# Patient Record
Sex: MALE | Race: WHITE | NOT HISPANIC OR LATINO | Employment: FULL TIME | URBAN - METROPOLITAN AREA
[De-identification: names, ages, dates, MRNs, and addresses within clinical notes are randomized per-mention and may not be internally consistent; named-entity substitution may affect disease eponyms.]

---

## 2018-04-12 ENCOUNTER — HOSPITAL ENCOUNTER (EMERGENCY)
Facility: HOSPITAL | Age: 33
Discharge: HOME | End: 2018-04-12
Attending: EMERGENCY MEDICINE | Admitting: EMERGENCY MEDICINE
Payer: COMMERCIAL

## 2018-04-12 VITALS
RESPIRATION RATE: 18 BRPM | HEART RATE: 87 BPM | TEMPERATURE: 99.3 F | SYSTOLIC BLOOD PRESSURE: 127 MMHG | OXYGEN SATURATION: 97 % | DIASTOLIC BLOOD PRESSURE: 61 MMHG | WEIGHT: 184 LBS

## 2018-04-12 DIAGNOSIS — Z76.0 ENCOUNTER FOR MEDICATION REFILL: Primary | ICD-10-CM

## 2018-04-12 DIAGNOSIS — F32.A DEPRESSION: ICD-10-CM

## 2018-04-12 PROCEDURE — 99281 EMR DPT VST MAYX REQ PHY/QHP: CPT

## 2018-04-12 RX ORDER — GABAPENTIN 800 MG/1
800 TABLET ORAL 3 TIMES DAILY
COMMUNITY
End: 2018-05-21

## 2018-04-12 RX ORDER — BUPROPION HYDROCHLORIDE 300 MG/1
300 TABLET ORAL DAILY
COMMUNITY
End: 2018-05-21

## 2018-04-12 RX ORDER — BUPROPION HYDROCHLORIDE 300 MG/1
300 TABLET ORAL DAILY
Qty: 30 TABLET | Refills: 0 | Status: SHIPPED | OUTPATIENT
Start: 2018-04-12 | End: 2018-05-21

## 2018-04-12 RX ORDER — GABAPENTIN 800 MG/1
800 TABLET ORAL 3 TIMES DAILY
Qty: 90 TABLET | Refills: 0 | Status: SHIPPED | OUTPATIENT
Start: 2018-04-12 | End: 2018-05-21

## 2018-04-12 NOTE — DISCHARGE INSTRUCTIONS
Depression   WHAT YOU NEED TO KNOW:   Depression is a medical condition that causes feelings of sadness or hopelessness that do not go away  Depression may cause you to lose interest in things you used to enjoy  These feelings may interfere with your daily life  DISCHARGE INSTRUCTIONS:   Call 911 for any of the following:   · You think about harming yourself or someone else  Contact your healthcare provider if:   · Your symptoms do not improve  · You cannot make it to your next appointment  · You have new symptoms  · You have questions or concerns about your condition or care  Medicines:   · Antidepressants  may be given to improve or balance your mood  You may need to take this medicine for several weeks before you begin to feel better  · Take your medicine as directed  Contact your healthcare provider if you think your medicine is not helping or if you have side effects  Tell him of her if you are allergic to any medicine  Keep a list of the medicines, vitamins, and herbs you take  Include the amounts, and when and why you take them  Bring the list or the pill bottles to follow-up visits  Carry your medicine list with you in case of an emergency  Therapy  may be used to treat your depression  A therapist will help you learn to cope with your thoughts and feelings  This can be done alone or in a group  It may also be done with family members or a significant other  Self-care:   · Get regular physical activity  Try to exercise for 30 minutes, 3 to 5 days a week  Work with your healthcare provider to develop an exercise plan that you enjoy  Physical activity may improve your symptoms  · Get enough sleep  Create a routine to help you relax before bed  You can listen to music, read, or do yoga  Try to go to bed and wake up at the same time every day  Sleep is important for emotional health  · Eat a variety of healthy foods from all of the food groups    A healthy meal plan is low in fat, salt, and added sugar  Ask your healthcare provider for more information about a meal plan that is right for you  · Do not drink alcohol or use drugs  Alcohol and drugs can make your symptoms worse  Follow up with your healthcare provider as directed: Your healthcare provider will monitor your progress at follow-up visits  He or she will also monitor your medicine if you take antidepressants  Your healthcare provider will ask if the medicine is helping  Tell him or her about any side effects or problems you may have with your medicine  The type or amount of medicine may need to be changed  Write down your questions so you remember to ask them during your visits  © 2017 [employer] Information is for End User's use only and may not be sold, redistributed or otherwise used for commercial purposes  All illustrations and images included in CareNotes® are the copyrighted property of [employer]  or Nikolai Nowak  The above information is an  only  It is not intended as medical advice for individual conditions or treatments  Talk to your doctor, nurse or pharmacist before following any medical regimen to see if it is safe and effective for you

## 2018-04-12 NOTE — ED PROVIDER NOTES
History  Chief Complaint   Patient presents with    Medication Refill     states switched insurance companies current Dr Bebeto Lincoln see him till billing issues are resolved states has only a few days left of his psyc meds     28 y/o M with Pmhx of depression and anxiety, who presents to the ED for medication refill  Patient reports that he is in the midst of switching insurances, and that his psychiatrist will not see him until his accounts have been cleared  Patient is about to run out of his medications  He called the insurance and doctors to try to get a refill, and they instructed him to come to the emergency department for medication refill  Patient denies any fevers, chills, chest pain, shortness of breath, nausea, vomiting, diarrhea, abdominal pain  He denies any current depression, anxiety, suicidal, homicidal ideation, audio or visual hallucinations  History provided by:  Patient   used: No    Medication Refill       Prior to Admission Medications   Prescriptions Last Dose Informant Patient Reported? Taking? buPROPion (WELLBUTRIN XL) 300 mg 24 hr tablet   Yes Yes   Sig: Take 300 mg by mouth daily   gabapentin (NEURONTIN) 800 mg tablet   Yes Yes   Sig: Take 800 mg by mouth 3 (three) times a day      Facility-Administered Medications: None       Past Medical History:   Diagnosis Date    Depression        Past Surgical History:   Procedure Laterality Date    LITHOTRIPSY         History reviewed  No pertinent family history  I have reviewed and agree with the history as documented  Social History   Substance Use Topics    Smoking status: Current Every Day Smoker     Packs/day: 0 50    Smokeless tobacco: Never Used    Alcohol use Yes        Review of Systems   Constitutional: Negative for chills and fever  HENT: Negative for congestion, ear pain, postnasal drip, rhinorrhea, sinus pain, sinus pressure, sore throat and trouble swallowing  Eyes: Negative      Respiratory: Negative for cough, chest tightness, shortness of breath and wheezing  Cardiovascular: Negative for chest pain, palpitations and leg swelling  Gastrointestinal: Negative for abdominal pain, anal bleeding, constipation, diarrhea, nausea and vomiting  Genitourinary: Negative for dysuria, flank pain, frequency, hematuria and urgency  Musculoskeletal: Negative for arthralgias, back pain, gait problem, joint swelling, myalgias, neck pain and neck stiffness  Skin: Negative for color change, pallor, rash and wound  Neurological: Negative for dizziness, syncope, weakness, light-headedness, numbness and headaches  Psychiatric/Behavioral: Negative  Negative for agitation, behavioral problems, confusion, decreased concentration, dysphoric mood, hallucinations, self-injury, sleep disturbance and suicidal ideas  The patient is not nervous/anxious and is not hyperactive  Physical Exam  ED Triage Vitals [04/12/18 1542]   Temperature Pulse Respirations Blood Pressure SpO2   99 3 °F (37 4 °C) 87 18 127/61 97 %      Temp Source Heart Rate Source Patient Position - Orthostatic VS BP Location FiO2 (%)   Oral Monitor Sitting Right arm --      Pain Score       No Pain           Orthostatic Vital Signs  Vitals:    04/12/18 1542   BP: 127/61   Pulse: 87   Patient Position - Orthostatic VS: Sitting       Physical Exam   Constitutional: He is oriented to person, place, and time  He appears well-developed and well-nourished  HENT:   Head: Normocephalic and atraumatic  Eyes: Conjunctivae and EOM are normal  Pupils are equal, round, and reactive to light  Neck: Normal range of motion  Neck supple  Cardiovascular: Normal rate, regular rhythm and intact distal pulses  Pulmonary/Chest: Effort normal and breath sounds normal  He has no wheezes  He has no rales  Abdominal: Soft  Bowel sounds are normal  He exhibits no distension  There is no tenderness  There is no rebound and no guarding     Musculoskeletal: Normal range of motion  He exhibits no edema or tenderness  Neurological: He is alert and oriented to person, place, and time  No cranial nerve deficit or sensory deficit  He exhibits normal muscle tone  Coordination normal    Skin: Skin is warm and dry  Capillary refill takes less than 2 seconds  Psychiatric: He has a normal mood and affect  His speech is normal and behavior is normal  Judgment and thought content normal  He is not actively hallucinating  Cognition and memory are normal  He is attentive  Nursing note and vitals reviewed  ED Medications  Medications - No data to display    Diagnostic Studies  Results Reviewed     None                 No orders to display              Procedures  Procedures       Phone Contacts  ED Phone Contact    ED Course  ED Course                                MDM  Number of Diagnoses or Management Options  Depression:   Encounter for medication refill:   Diagnosis management comments: 30 y/o M with Pmhx of depression and anxiety, who presents to the ED for medication refill  Differential Diagnosis includes but is not limited to: medication refill  Medication refill for Wellbutrin and Neurontin given to the patient  He has been instructed to follow up with his primary care doctor and psychiatrist for further evaluation and future medication refills  Patient understands that he cannot continue to come to the emergency department for medication refills, but expressed that he did not know how to get his medications otherwise  He will be discharged at this time      CritCare Time    Disposition  Final diagnoses:   Encounter for medication refill   Depression     Time reflects when diagnosis was documented in both MDM as applicable and the Disposition within this note     Time User Action Codes Description Comment    4/12/2018  4:01 PM Rebecca Barrera Add [Z76 0] Encounter for medication refill     4/12/2018  4:01 PM Rebecca Barrera Add [F32 9] Depression       ED Disposition ED Disposition Condition Comment    Discharge  Montes Re discharge to home/self care  Condition at discharge: Good        Follow-up Information     Follow up With Specialties Details Why [facility]   [address] [place] [address]  [id number] [phone]        Discharge Medication List as of 4/12/2018  4:07 PM      START taking these medications    Details   !! buPROPion (WELLBUTRIN XL) 300 mg 24 hr tablet Take 1 tablet (300 mg total) by mouth daily, Starting Thu 4/12/2018, Print      !! gabapentin (NEURONTIN) 800 mg tablet Take 1 tablet (800 mg total) by mouth 3 (three) times a day for 30 days, Starting Thu 4/12/2018, Until Sat 5/12/2018, Print       !! - Potential duplicate medications found  Please discuss with provider  CONTINUE these medications which have NOT CHANGED    Details   !! buPROPion (WELLBUTRIN XL) 300 mg 24 hr tablet Take 300 mg by mouth daily, Historical Med      !! gabapentin (NEURONTIN) 800 mg tablet Take 800 mg by mouth 3 (three) times a day, Historical Med       !! - Potential duplicate medications found  Please discuss with provider  No discharge procedures on file      ED Provider  Electronically Signed by           Debbie Hurley PA-C  04/12/18 7155

## 2018-05-21 ENCOUNTER — HOSPITAL ENCOUNTER (EMERGENCY)
Facility: HOSPITAL | Age: 33
Discharge: HOME | End: 2018-05-21
Attending: EMERGENCY MEDICINE | Admitting: EMERGENCY MEDICINE
Payer: COMMERCIAL

## 2018-05-21 VITALS
OXYGEN SATURATION: 99 % | DIASTOLIC BLOOD PRESSURE: 65 MMHG | RESPIRATION RATE: 18 BRPM | HEART RATE: 95 BPM | SYSTOLIC BLOOD PRESSURE: 145 MMHG | WEIGHT: 180 LBS | TEMPERATURE: 98.3 F

## 2018-05-21 DIAGNOSIS — Z76.0 ENCOUNTER FOR MEDICATION REFILL: ICD-10-CM

## 2018-05-21 DIAGNOSIS — Z76.0 MEDICATION REFILL: Primary | ICD-10-CM

## 2018-05-21 PROCEDURE — 99281 EMR DPT VST MAYX REQ PHY/QHP: CPT

## 2018-05-21 RX ORDER — GABAPENTIN 800 MG/1
800 TABLET ORAL 3 TIMES DAILY
Qty: 90 TABLET | Refills: 0 | Status: SHIPPED | OUTPATIENT
Start: 2018-05-21 | End: 2018-06-20

## 2018-05-21 RX ORDER — BUPROPION HYDROCHLORIDE 300 MG/1
300 TABLET ORAL DAILY
Qty: 30 TABLET | Refills: 0 | Status: SHIPPED | OUTPATIENT
Start: 2018-05-21 | End: 2019-12-09

## 2018-05-21 NOTE — ED PROVIDER NOTES
History  Chief Complaint   Patient presents with    Medication Refill     had eval through [facility], is waiting to hear back from them regarding an appointment   has gabapentin through tomorrow, last wellbutrin yesterday  takes gabapentin 800 TID, wellbutrin 30 ER daily  denies additional complaints  This is a 82-year-old male patient with depression  He takes 300 mg of Wellbutrin XL daily and gabapentin 100 mg t i d  He has appointment in a month and a half  He has been having insurance issues has been having go to a [facility] for refills  He is not homicidal or suicidal he has no complaints  I believe that he is making valid attempts to follow-up but because of insurance issues he is having difficulty  I will refill his prescriptions  Prior to Admission Medications   Prescriptions Last Dose Informant Patient Reported? Taking? buPROPion (WELLBUTRIN XL) 300 mg 24 hr tablet   No Yes   Sig: Take 1 tablet (300 mg total) by mouth daily   gabapentin (NEURONTIN) 800 mg tablet   Yes Yes   Sig: Take 800 mg by mouth 3 (three) times a day      Facility-Administered Medications: None       Past Medical History:   Diagnosis Date    Depression        Past Surgical History:   Procedure Laterality Date    LITHOTRIPSY         History reviewed  No pertinent family history  I have reviewed and agree with the history as documented  Social History   Substance Use Topics    Smoking status: Current Every Day Smoker     Packs/day: 0 50    Smokeless tobacco: Never Used    Alcohol use Yes        Review of Systems   All other systems reviewed and are negative  Physical Exam  Physical Exam   Constitutional: He appears well-developed and well-nourished  HENT:   Head: Normocephalic and atraumatic     Right Ear: External ear normal    Left Ear: External ear normal    Nose: Nose normal    Mouth/Throat: Oropharynx is clear and moist    Eyes: Conjunctivae are normal  Pupils are equal, round, and reactive to light    Neck: Normal range of motion  Neck supple  Cardiovascular: Normal rate and regular rhythm  Pulmonary/Chest: Effort normal and breath sounds normal    Abdominal: Soft  Bowel sounds are normal  There is no tenderness  Neurological: He is alert  Skin: Skin is warm  Psychiatric: He has a normal mood and affect  His speech is normal and behavior is normal  Judgment and thought content normal  Thought content is not paranoid and not delusional  Cognition and memory are normal  He expresses no homicidal and no suicidal ideation  He expresses no homicidal plans  Nursing note and vitals reviewed  Vital Signs  ED Triage Vitals [05/21/18 1615]   Temperature Pulse Respirations Blood Pressure SpO2   98 3 °F (36 8 °C) 95 18 145/65 99 %      Temp src Heart Rate Source Patient Position - Orthostatic VS BP Location FiO2 (%)   -- -- -- -- --      Pain Score       No Pain           Vitals:    05/21/18 1615   BP: 145/65   Pulse: 95       Visual Acuity      ED Medications  Medications - No data to display    Diagnostic Studies  Results Reviewed     None                 No orders to display              Procedures  Procedures       Phone Contacts  ED Phone Contact    ED Course                               MDM  CritCare Time    Disposition  Final diagnoses:   Medication refill     Time reflects when diagnosis was documented in both MDM as applicable and the Disposition within this note     Time User Action Codes Description Comment    5/21/2018  4:46 PM Megan Graf Add [Z76 0] Medication refill     5/21/2018  4:46 PM Megan Graf Add [Z76 0] Encounter for medication refill       ED Disposition     ED Disposition Condition Comment    Discharge  Vivienne Cardenas discharge to home/self care      Condition at discharge: Good        Follow-up Information     Follow up With Specialties Details Why [facility] Schedule an appointment as soon as possible for a visit  [address] [place] [address]  [id number] [phone]          Patient's Medications   Discharge Prescriptions    No medications on file     No discharge procedures on file      ED Provider  Electronically Signed by           Chico Novoa PA-C  05/21/18 9741

## 2018-05-21 NOTE — DISCHARGE INSTRUCTIONS
Medicine Refill   WHAT YOU NEED TO KNOW:   You may have been given a prescription in the emergency department for a few days of your medicine  It is important to refill your medicine before you completely run out  You need to follow up with your healthcare provider for a full prescription within the next few days  You will not be given additional refills in the emergency department  DISCHARGE INSTRUCTIONS:   Follow up with your healthcare provider:  Contact your healthcare provider before  you are completely out of medicine  Write down your questions so you remember to ask them during your visits  Refill tips:  Your medicine will treat your condition if you take the medicine regularly  Prevent missed doses by doing the following:  · Keep a chart of your medicine  Include all of your current medicines  Write down the name and strength of each medicine, the prescription number, and the number of refills  Also write down the dates of your refills  Ask your pharmacy or insurance provider for other ways to help you keep track of your medicines  · Refill medicines a few days before you run out  This will decrease any problems that will prevent you from getting your medicines on time  Problems include a closed pharmacy, or the pharmacy may have to contact your healthcare provider  · If you know you are going to be traveling, refill your medicines before you leave  You may not be able to get refills if you do not use your local pharmacy  You may need to call your insurance provider to make them aware of your travels  © 2017 [employer] Information is for End User's use only and may not be sold, redistributed or otherwise used for commercial purposes  All illustrations and images included in CareNotes® are the copyrighted property of [employer]  or [employer]  The above information is an  only   It is not intended as medical advice for individual conditions or treatments  Talk to your doctor, nurse or pharmacist before following any medical regimen to see if it is safe and effective for you

## 2019-12-09 ENCOUNTER — HOSPITAL ENCOUNTER (EMERGENCY)
Facility: HOSPITAL | Age: 34
Discharge: HOME | End: 2019-12-09
Attending: EMERGENCY MEDICINE
Payer: COMMERCIAL

## 2019-12-09 VITALS
SYSTOLIC BLOOD PRESSURE: 159 MMHG | RESPIRATION RATE: 18 BRPM | OXYGEN SATURATION: 96 % | HEART RATE: 93 BPM | TEMPERATURE: 98.3 F | WEIGHT: 167.55 LBS | DIASTOLIC BLOOD PRESSURE: 74 MMHG

## 2019-12-09 DIAGNOSIS — Z76.0 MEDICATION REFILL: Primary | ICD-10-CM

## 2019-12-09 PROCEDURE — 99284 EMERGENCY DEPT VISIT MOD MDM: CPT | Performed by: PHYSICIAN ASSISTANT

## 2019-12-09 PROCEDURE — 99281 EMR DPT VST MAYX REQ PHY/QHP: CPT

## 2019-12-09 RX ORDER — DOXEPIN HYDROCHLORIDE 100 MG/1
100 CAPSULE ORAL
Qty: 14 CAPSULE | Refills: 1 | Status: SHIPPED | OUTPATIENT
Start: 2019-12-09 | End: 2020-02-18 | Stop reason: SDUPTHER

## 2019-12-09 RX ORDER — GABAPENTIN 400 MG/1
400 CAPSULE ORAL 3 TIMES DAILY
Qty: 42 CAPSULE | Refills: 1 | Status: SHIPPED | OUTPATIENT
Start: 2019-12-09 | End: 2020-02-18 | Stop reason: SDUPTHER

## 2019-12-09 RX ORDER — VENLAFAXINE HYDROCHLORIDE 150 MG/1
150 CAPSULE, EXTENDED RELEASE ORAL DAILY
Qty: 14 CAPSULE | Refills: 0 | Status: SHIPPED | OUTPATIENT
Start: 2019-12-09 | End: 2020-02-18 | Stop reason: SDUPTHER

## 2019-12-11 NOTE — ED PROVIDER NOTES
History  Chief Complaint   Patient presents with    Medication Refill     patient off psych meds since friday  patient cant get into psychiatrist 1/3  patient feeling foggy and unable to concentrate     Temi Catherine is a 31 yo M, w/ PMH of depression, PTSD, anxiety, presenting for medication refill of psychiatric meds which he ran out of on Friday  Pt describes "fogginess" when attempting to focus and increased anxiety since stopping medications  Denies suicidal or homicidal ideation  States he has psychiatry appointment scheduled for early next month, but does not currently have medications at home  Pt requests refills of gabapentin, effexor, and doxepin  Of note, pt did have rx for effexor on 11/18 with next refill on 12/18, but states it was "thown away" when he was moving  Denies suicidal or homicidal thoughts/ideations  History provided by:  Patient   used: No    Medication Refill   Medications/supplies requested:  Gabapentin, effexor, doxepin  Reason for request:  Medications ran out  Medications taken before: yes - see home medications    Patient has complete original prescription information: yes        Prior to Admission Medications   Prescriptions Last Dose Informant Patient Reported? Taking?   gabapentin (NEURONTIN) 800 mg tablet   No No   Sig: Take 1 tablet (800 mg total) by mouth 3 (three) times a day for 30 days      Facility-Administered Medications: None       Past Medical History:   Diagnosis Date    Depression        Past Surgical History:   Procedure Laterality Date    LITHOTRIPSY         History reviewed  No pertinent family history  I have reviewed and agree with the history as documented  Social History     Tobacco Use    Smoking status: Current Every Day Smoker     Packs/day: 0 50    Smokeless tobacco: Never Used   Substance Use Topics    Alcohol use: Yes    Drug use: No        Review of Systems   Constitutional: Negative for chills and fever     HENT: Negative for congestion, rhinorrhea and sore throat  Eyes: Negative for pain and visual disturbance  Respiratory: Negative for cough, shortness of breath and wheezing  Cardiovascular: Negative for chest pain and palpitations  Gastrointestinal: Negative for abdominal pain, nausea and vomiting  Genitourinary: Negative for dysuria, frequency and urgency  Musculoskeletal: Negative for back pain, neck pain and neck stiffness  Skin: Negative for rash and wound  Neurological: Negative for dizziness, weakness, light-headedness and numbness  Psychiatric/Behavioral: Positive for decreased concentration and sleep disturbance  Negative for self-injury and suicidal ideas  The patient is nervous/anxious  Physical Exam  Physical Exam   Constitutional: He is oriented to person, place, and time  He appears well-developed and well-nourished  No distress  HENT:   Head: Normocephalic and atraumatic  Right Ear: External ear normal    Left Ear: External ear normal    Mouth/Throat: Oropharynx is clear and moist    Eyes: Pupils are equal, round, and reactive to light  Conjunctivae and EOM are normal    Neck: Normal range of motion  Neck supple  Cardiovascular: Normal rate, regular rhythm, normal heart sounds and intact distal pulses  Exam reveals no gallop and no friction rub  No murmur heard  Pulmonary/Chest: Effort normal and breath sounds normal  No respiratory distress  He has no wheezes  Abdominal: Soft  He exhibits no distension  There is no tenderness  Lymphadenopathy:     He has no cervical adenopathy  Neurological: He is alert and oriented to person, place, and time  He exhibits normal muscle tone  Coordination normal    Skin: Skin is warm and dry  Capillary refill takes less than 2 seconds  No rash noted  He is not diaphoretic  No erythema  Psychiatric: He has a normal mood and affect   His speech is normal and behavior is normal  Judgment normal  He expresses no homicidal and no suicidal ideation  He expresses no suicidal plans and no homicidal plans  Vital Signs  ED Triage Vitals [12/09/19 1819]   Temperature Pulse Respirations Blood Pressure SpO2   98 3 °F (36 8 °C) 93 18 159/74 96 %      Temp Source Heart Rate Source Patient Position - Orthostatic VS BP Location FiO2 (%)   Oral Monitor Sitting Right arm --      Pain Score       --           Vitals:    12/09/19 1819   BP: 159/74   Pulse: 93   Patient Position - Orthostatic VS: Sitting         Visual Acuity      ED Medications  Medications - No data to display    Diagnostic Studies  Results Reviewed     None                 No orders to display              Procedures  Procedures         ED Course                               MDM  Number of Diagnoses or Management Options  Medication refill:   Diagnosis management comments: Pt ran out of at home medications, next appointment with psychiatry early next month  Pt does have refill of effexor on 12/18  Increased anxiety, difficulty focusing, and decreased sleep since running out  Pt's exam is unremarkable  No SI/HI  Will prescribe 10 days of effexor, refill doxepin and gabapentin until scheduled follow up  Pt verbalizes understanding of importance of taking medications only as prescribed as well as of not combining with alcohol or illicit substances  Verbalizes understanding of the importance of follow up as well as of strict return indications  Patient Progress  Patient progress: stable        Disposition  Final diagnoses:   Medication refill     Time reflects when diagnosis was documented in both MDM as applicable and the Disposition within this note     Time User Action Codes Description Comment    12/9/2019  7:18 PM Roshan Tarango Add [Z76 0] Medication refill       ED Disposition     ED Disposition Condition Date/Time Comment    Discharge Stable Mon Dec 9, 2019  7:18 PM Clearnce Fill discharge to home/self care              Follow-up Information     Follow up With Specialties Details Why Contact Info Additional Information    [facility] Schedule an appointment as soon as possible for a visit   [facility] [place]note 2[facility] [address]  [address] [place] [address], Yulisa Gonzalez [address], [facility] [place], [place], [id number] [phone]          Discharge Medication List as of 12/9/2019  7:25 PM      START taking these medications    Details   doxepin (SINEquan) 100 mg capsule Take 1 capsule (100 mg total) by mouth daily at bedtime, Starting Mon 12/9/2019, Normal      gabapentin (NEURONTIN) 400 mg capsule Take 1 capsule (400 mg total) by mouth 3 (three) times a day for 14 days, Starting Mon 12/9/2019, Until Mon 12/23/2019, Normal      venlafaxine (EFFEXOR-XR) 150 mg 24 hr capsule Take 1 capsule (150 mg total) by mouth daily for 14 days, Starting Mon 12/9/2019, Until Mon 12/23/2019, Normal         CONTINUE these medications which have NOT CHANGED    Details   gabapentin (NEURONTIN) 800 mg tablet Take 1 tablet (800 mg total) by mouth 3 (three) times a day for 30 days, Starting Mon 5/21/2018, Until Wed 6/20/2018, Print           No discharge procedures on file      ED Provider  Electronically Signed by           Serg Mancuso PA-C  12/11/19 4656

## 2020-02-18 ENCOUNTER — HOSPITAL ENCOUNTER (EMERGENCY)
Facility: HOSPITAL | Age: 35
Discharge: HOME | End: 2020-02-18
Attending: EMERGENCY MEDICINE
Payer: COMMERCIAL

## 2020-02-18 VITALS
TEMPERATURE: 97.6 F | OXYGEN SATURATION: 98 % | HEART RATE: 88 BPM | RESPIRATION RATE: 16 BRPM | WEIGHT: 176.81 LBS | DIASTOLIC BLOOD PRESSURE: 71 MMHG | SYSTOLIC BLOOD PRESSURE: 129 MMHG

## 2020-02-18 DIAGNOSIS — Z76.0 MEDICATION REFILL: Primary | ICD-10-CM

## 2020-02-18 PROCEDURE — 99281 EMR DPT VST MAYX REQ PHY/QHP: CPT

## 2020-02-18 PROCEDURE — 99281 EMR DPT VST MAYX REQ PHY/QHP: CPT | Performed by: EMERGENCY MEDICINE

## 2020-02-18 RX ORDER — DOXEPIN HYDROCHLORIDE 100 MG/1
100 CAPSULE ORAL
Qty: 30 CAPSULE | Refills: 0 | Status: SHIPPED | OUTPATIENT
Start: 2020-02-18 | End: 2020-03-19

## 2020-02-18 RX ORDER — VENLAFAXINE HYDROCHLORIDE 75 MG/1
75 CAPSULE, EXTENDED RELEASE ORAL DAILY
Qty: 30 CAPSULE | Refills: 0 | Status: SHIPPED | OUTPATIENT
Start: 2020-02-18 | End: 2020-03-19

## 2020-02-18 RX ORDER — GABAPENTIN 300 MG/1
600 CAPSULE ORAL 3 TIMES DAILY
Qty: 180 CAPSULE | Refills: 0 | Status: SHIPPED | OUTPATIENT
Start: 2020-02-18 | End: 2020-03-19

## 2020-02-19 NOTE — ED PROVIDER NOTES
History  Chief Complaint   Patient presents with    Medication Refill     Patient [facility], was placed on venlafaxine, doxepin and gabapentin  States he ran out today and doesn't have an appt for about a month  31 yo male asking for refills of gabapentin, venlafaxine, and doxepin with doses reconciled by nurse  These are long term medications, which he admits have been often refilled by EDs because he has not typically followed through with outpatient followup, but since his last release from [facility], he ran out of the medications prescribed at that time, and has follow up scheduled on or about 3/27  He denies any other new or concerning issues   History provided by:  Patient      Prior to Admission Medications   Prescriptions Last Dose Informant Patient Reported? Taking?   doxepin (SINEquan) 100 mg capsule 2/17/2020 at Unknown time  No Yes   Sig: Take 1 capsule (100 mg total) by mouth daily at bedtime   doxepin (SINEquan) 100 mg capsule   No No   Sig: Take 1 capsule (100 mg total) by mouth daily at bedtime   gabapentin (NEURONTIN) 300 mg capsule   No No   Sig: Take 2 capsules (600 mg total) by mouth 3 (three) times a day   gabapentin (NEURONTIN) 400 mg capsule 2/17/2020 at Unknown time  No Yes   Sig: Take 1 capsule (400 mg total) by mouth 3 (three) times a day for 14 days   Patient taking differently: Take 600 mg by mouth 3 (three) times a day    venlafaxine (EFFEXOR-XR) 150 mg 24 hr capsule 2/17/2020 at Unknown time  No Yes   Sig: Take 1 capsule (150 mg total) by mouth daily for 14 days   Patient taking differently: Take 75 mg by mouth daily    venlafaxine (EFFEXOR-XR) 75 mg 24 hr capsule   No No   Sig: Take 1 capsule (75 mg total) by mouth daily      Facility-Administered Medications: None       Past Medical History:   Diagnosis Date    Depression        Past Surgical History:   Procedure Laterality Date    LITHOTRIPSY         History reviewed   No pertinent family history  I have reviewed and agree with the history as documented  Social History     Tobacco Use    Smoking status: Current Every Day Smoker     Packs/day: 0 50     Types: Cigarettes    Smokeless tobacco: Never Used   Substance Use Topics    Alcohol use: Not Currently    Drug use: No       Review of Systems   Constitutional: Negative for appetite change, chills and fever  HENT: Negative for sore throat  Respiratory: Negative for cough, shortness of breath and wheezing  Cardiovascular: Negative for chest pain and palpitations  Gastrointestinal: Negative for abdominal pain, diarrhea, nausea and vomiting  Genitourinary: Negative for dysuria and hematuria  Musculoskeletal: Negative for neck pain  Skin: Negative for rash  Neurological: Negative for dizziness, weakness and headaches  Psychiatric/Behavioral: Negative for suicidal ideas  All other systems reviewed and are negative  Physical Exam  Physical Exam   Constitutional: He is oriented to person, place, and time  He appears well-developed and well-nourished  HENT:   Head: Normocephalic and atraumatic  Right Ear: External ear normal    Left Ear: External ear normal    Nose: Nose normal    Mouth/Throat: Oropharynx is clear and moist    Eyes: Pupils are equal, round, and reactive to light  Conjunctivae and EOM are normal    Neck: Normal range of motion  Neck supple  Cardiovascular: Normal rate  Pulmonary/Chest: Effort normal    Abdominal: There is no tenderness  Musculoskeletal: Normal range of motion  Neurological: He is alert and oriented to person, place, and time  No cranial nerve deficit  Coordination normal    Skin: Skin is warm and dry  Capillary refill takes less than 2 seconds  Psychiatric: He has a normal mood and affect  His behavior is normal  Judgment and thought content normal    Nursing note and vitals reviewed        Vital Signs  ED Triage Vitals [02/18/20 2014]   Temperature Pulse Respirations Blood Pressure SpO2   97 6 °F (36 4 °C) 88 16 129/71 98 %      Temp Source Heart Rate Source Patient Position - Orthostatic VS BP Location FiO2 (%)   Temporal Monitor Sitting Right arm --      Pain Score       No Pain           Vitals:    02/18/20 2014   BP: 129/71   Pulse: 88   Patient Position - Orthostatic VS: Sitting         Visual Acuity      ED Medications  Medications - No data to display    Diagnostic Studies  Results Reviewed     None                 No orders to display              Procedures  Procedures         ED Course                               MDM      Disposition  Final diagnoses:   Medication refill     Time reflects when diagnosis was documented in both MDM as applicable and the Disposition within this note     Time User Action Codes Description Comment    2/18/2020  8:44 PM Gisela Xiong Add [Z76 0] Medication refill       ED Disposition     ED Disposition Condition Date/Time Comment    Discharge Good Tue Feb 18, 2020  8:44 PM Kathy Asencio discharge to home/self care  Follow-up Information     Follow up With Specialties Details Why Contact Info    [employer]  Call  As needed for other information about follow up options [phone]            Discharge Medication List as of 2/18/2020  8:46 PM      CONTINUE these medications which have CHANGED    Details   doxepin (SINEquan) 100 mg capsule Take 1 capsule (100 mg total) by mouth daily at bedtime, Starting Tue 2/18/2020, Until Thu 3/19/2020, Print      gabapentin (NEURONTIN) 300 mg capsule Take 2 capsules (600 mg total) by mouth 3 (three) times a day, Starting Tue 2/18/2020, Until Thu 3/19/2020, Print      venlafaxine (EFFEXOR-XR) 75 mg 24 hr capsule Take 1 capsule (75 mg total) by mouth daily, Starting Tue 2/18/2020, Until Thu 3/19/2020, Print           No discharge procedures on file      PDMP Review     None          ED Provider  Electronically Signed by           Irasema Sams MD  02/18/20 2052